# Patient Record
Sex: FEMALE | Race: WHITE
[De-identification: names, ages, dates, MRNs, and addresses within clinical notes are randomized per-mention and may not be internally consistent; named-entity substitution may affect disease eponyms.]

---

## 2018-07-27 ENCOUNTER — HOSPITAL ENCOUNTER (OUTPATIENT)
Dept: HOSPITAL 47 - RADMAMWWP | Age: 53
Discharge: HOME | End: 2018-07-27
Payer: COMMERCIAL

## 2018-07-27 DIAGNOSIS — Z12.31: Primary | ICD-10-CM

## 2018-07-27 PROCEDURE — 77063 BREAST TOMOSYNTHESIS BI: CPT

## 2018-07-27 PROCEDURE — 77067 SCR MAMMO BI INCL CAD: CPT

## 2018-08-01 NOTE — MM
Reason for exam: screening  (asymptomatic).

Last mammogram was performed 2 years ago.



History:

Family history of breast cancer.



Physical Findings:

A clinical breast exam by your physician is recommended on an annual basis and 

results should be correlated with mammographic findings.



MG 3D Screening Mammo W/Cad

Bilateral CC and MLO view(s) were taken.

Prior study comparison: July 26, 2016, bilateral MG 3d screening mammo w/cad.  

December 4, 2006, CAD bilateral diagnostic mammogram.

There are two new subcentimeter left breast masses at posterior depth. First upper

outer quadrant and the second central inner left breast. There is a new central 

lower right breast mass.





ASSESSMENT: Incomplete: need additional imaging evaluation, BI-RAD 0



RECOMMENDATION:

Special view mammogram of both breasts.



If lesion persists on supplemental views, image directed ultrasound is 

recommended.



[facility] will attempt to contact patient to return for supplemental 

views and ultrasound if indicated.

## 2018-08-09 ENCOUNTER — HOSPITAL ENCOUNTER (OUTPATIENT)
Dept: HOSPITAL 47 - RADMAMWWP | Age: 53
Discharge: HOME | End: 2018-08-09
Payer: COMMERCIAL

## 2018-08-09 DIAGNOSIS — R92.8: Primary | ICD-10-CM

## 2018-08-09 PROCEDURE — 77062 BREAST TOMOSYNTHESIS BI: CPT

## 2018-08-09 PROCEDURE — 77066 DX MAMMO INCL CAD BI: CPT

## 2018-08-11 NOTE — USB
Reason for exam: additional evaluation requested from abnormal screening.



History:

Patient is postmenopausal.

Family history of breast cancer in paternal grandmother.

Took hormonal contraceptives for 10 years.  Taking estrogen for 11 months.



US Breast Workup Limited NICCI

Bilateral complete breast ultrasound includes all four quadrants, the retroareolar

region and axilla. Finding demonstrates no cystic or solid lesion seen in either 

breast.





ASSESSMENT: Probably benign, BI-RAD 3



RECOMMENDATION:

Follow-up diagnostic mammogram of both breasts in 6 months.

## 2018-08-11 NOTE — MM
Reason for exam: additional evaluation requested from abnormal screening.

Last mammogram was performed less than 1 month ago.



History:

Patient is postmenopausal.

Family history of breast cancer in paternal grandmother.

Took hormonal contraceptives for 10 years.  Taking estrogen for 11 months.



Physical Findings:

Nurse did not find any significant physical abnormalities on exam.



MG 3D Work Up W/Cad NICCI

Bilateral spot compression CC, spot compression MLO, and LM view(s) were taken.

Prior study comparison: July 27, 2018, bilateral MG 3d screening mammo w/cad.  

July 26, 2016, bilateral MG 3d screening mammo w/cad.

There are scattered fibroglandular densities.

Finding: There is a 5 mm oval circumscribed lesion in the lower outer posterior 

right breast.

Left persists largest lower portion medial 4-5 mm.



These results were verbally communicated with the patient and result sheet given 

to the patient on 8/9/18.





ASSESSMENT: Incomplete: need additional imaging evaluation, BI-RAD 0



RECOMMENDATION:

Ultrasound of both breasts.

## 2019-10-27 ENCOUNTER — HOSPITAL ENCOUNTER (OUTPATIENT)
Dept: HOSPITAL 47 - EC | Age: 54
Setting detail: OBSERVATION
LOS: 1 days | Discharge: HOME | End: 2019-10-28
Payer: COMMERCIAL

## 2019-10-27 DIAGNOSIS — Z82.49: ICD-10-CM

## 2019-10-27 DIAGNOSIS — Z90.49: ICD-10-CM

## 2019-10-27 DIAGNOSIS — Z79.899: ICD-10-CM

## 2019-10-27 DIAGNOSIS — Z88.2: ICD-10-CM

## 2019-10-27 DIAGNOSIS — J45.909: ICD-10-CM

## 2019-10-27 DIAGNOSIS — E78.5: ICD-10-CM

## 2019-10-27 DIAGNOSIS — Z90.710: ICD-10-CM

## 2019-10-27 DIAGNOSIS — Z88.1: ICD-10-CM

## 2019-10-27 DIAGNOSIS — I10: ICD-10-CM

## 2019-10-27 DIAGNOSIS — R07.89: Primary | ICD-10-CM

## 2019-10-27 DIAGNOSIS — Z96.643: ICD-10-CM

## 2019-10-27 LAB
ALBUMIN SERPL-MCNC: 4.7 G/DL (ref 3.5–5)
ALP SERPL-CCNC: 75 U/L (ref 38–126)
ALT SERPL-CCNC: 39 U/L (ref 9–52)
ANION GAP SERPL CALC-SCNC: 12 MMOL/L
APTT BLD: 24.4 SEC (ref 22–30)
AST SERPL-CCNC: 26 U/L (ref 14–36)
BASOPHILS # BLD AUTO: 0.1 K/UL (ref 0–0.2)
BASOPHILS NFR BLD AUTO: 1 %
BUN SERPL-SCNC: 18 MG/DL (ref 7–17)
CALCIUM SPEC-MCNC: 10.4 MG/DL (ref 8.4–10.2)
CHLORIDE SERPL-SCNC: 103 MMOL/L (ref 98–107)
CO2 SERPL-SCNC: 27 MMOL/L (ref 22–30)
EOSINOPHIL # BLD AUTO: 0.2 K/UL (ref 0–0.7)
EOSINOPHIL NFR BLD AUTO: 3 %
ERYTHROCYTE [DISTWIDTH] IN BLOOD BY AUTOMATED COUNT: 4.85 M/UL (ref 3.8–5.4)
ERYTHROCYTE [DISTWIDTH] IN BLOOD: 12.6 % (ref 11.5–15.5)
GLUCOSE SERPL-MCNC: 86 MG/DL (ref 74–99)
HCT VFR BLD AUTO: 43.1 % (ref 34–46)
HGB BLD-MCNC: 14.2 GM/DL (ref 11.4–16)
INR PPP: 0.9 (ref ?–1.2)
LYMPHOCYTES # SPEC AUTO: 2.4 K/UL (ref 1–4.8)
LYMPHOCYTES NFR SPEC AUTO: 38 %
MAGNESIUM SPEC-SCNC: 2.2 MG/DL (ref 1.6–2.3)
MCH RBC QN AUTO: 29.4 PG (ref 25–35)
MCHC RBC AUTO-ENTMCNC: 33 G/DL (ref 31–37)
MCV RBC AUTO: 88.9 FL (ref 80–100)
MONOCYTES # BLD AUTO: 0.3 K/UL (ref 0–1)
MONOCYTES NFR BLD AUTO: 4 %
NEUTROPHILS # BLD AUTO: 3.4 K/UL (ref 1.3–7.7)
NEUTROPHILS NFR BLD AUTO: 52 %
PLATELET # BLD AUTO: 367 K/UL (ref 150–450)
POTASSIUM SERPL-SCNC: 3.8 MMOL/L (ref 3.5–5.1)
PROT SERPL-MCNC: 7.8 G/DL (ref 6.3–8.2)
PT BLD: 9.8 SEC (ref 9–12)
SODIUM SERPL-SCNC: 142 MMOL/L (ref 137–145)
WBC # BLD AUTO: 6.4 K/UL (ref 3.8–10.6)

## 2019-10-27 PROCEDURE — 93005 ELECTROCARDIOGRAM TRACING: CPT

## 2019-10-27 PROCEDURE — 83735 ASSAY OF MAGNESIUM: CPT

## 2019-10-27 PROCEDURE — 80061 LIPID PANEL: CPT

## 2019-10-27 PROCEDURE — 80048 BASIC METABOLIC PNL TOTAL CA: CPT

## 2019-10-27 PROCEDURE — 93351 STRESS TTE COMPLETE: CPT

## 2019-10-27 PROCEDURE — 85025 COMPLETE CBC W/AUTO DIFF WBC: CPT

## 2019-10-27 PROCEDURE — 93306 TTE W/DOPPLER COMPLETE: CPT

## 2019-10-27 PROCEDURE — 36415 COLL VENOUS BLD VENIPUNCTURE: CPT

## 2019-10-27 PROCEDURE — 84484 ASSAY OF TROPONIN QUANT: CPT

## 2019-10-27 PROCEDURE — 85610 PROTHROMBIN TIME: CPT

## 2019-10-27 PROCEDURE — 99285 EMERGENCY DEPT VISIT HI MDM: CPT

## 2019-10-27 PROCEDURE — 85730 THROMBOPLASTIN TIME PARTIAL: CPT

## 2019-10-27 PROCEDURE — 80053 COMPREHEN METABOLIC PANEL: CPT

## 2019-10-27 PROCEDURE — 71046 X-RAY EXAM CHEST 2 VIEWS: CPT

## 2019-10-27 NOTE — XR
EXAMINATION TYPE: XR chest 2V

 

DATE OF EXAM: 10/27/2019

 

COMPARISON: NONE

 

HISTORY: Chest pain

 

TECHNIQUE:  Frontal and lateral views of the chest are obtained.

 

FINDINGS:  Heart and mediastinum are normal. Lungs are clear. Diaphragm is normal. Bony thorax appear
s intact. There are chest leads.

 

IMPRESSION:  Normal chest.

## 2019-10-27 NOTE — ED
Chest Pain HPI





- General


Chief Complaint: Chest Pain


Stated Complaint: chest pain


Time Seen by Provider: 10/27/19 12:40


Source: patient


Mode of arrival: wheelchair


Limitations: no limitations





- History of Present Illness


Initial Comments: 





The patient is a 54-year-old female presents emergency Department with reported 

chest pain.  She states the symptoms started 1 hour prior to arrival to the 

emergency room.  States that she was in line at the grocery store when the 

symptoms began.  She describes it as a chest pressure with radiation to her left

jaw.  Denies associated nausea or vomiting.  No shortness of breath.  No history

of DVT or PE.  No ripping or tearing sensation to her back.  No hormone use.  No

history of blood clotting disorders.  No headaches or visual changes.  Does not 

take any medications for her symptoms.  Presented to the emergency department 

and the symptoms have resolved.  She states she has a large family history of 

cardiac disease.  Her brother  at the age of 41 from a heart attack.  The 

patient's last stress test was in 2017.  She has a history of hypertension.  No 

history of smoking.  Denies hemoptysis, abdominal pain.  There are no 

alleviating, precipitating or modifying factors





- Related Data


                                Home Medications











 Medication  Instructions  Recorded  Confirmed


 


Albuterol Inhaler [Ventolin Hfa 1 - 2 puff INHALATION RT-Q6H PRN 10/27/19 

10/27/19





Inhaler]   


 


Ibuprofen [Motrin Ib] 400 mg PO Q6H PRN 10/27/19 10/27/19


 


Montelukast [Singulair] 10 mg PO DAILY 10/27/19 10/27/19


 


amLODIPine BESYLATE/BENAZEPRIL 1 cap PO DAILY 10/27/19 10/27/19





[Lotrel 5-10 MG]   








                                  Previous Rx's











 Medication  Instructions  Recorded


 


Atorvastatin [Lipitor] 20 mg PO HS #30 tab 10/28/19











                                    Allergies











Allergy/AdvReac Type Severity Reaction Status Date / Time


 


Sulfa (Sulfonamide Allergy Unknown Unknown Verified 10/27/19 13:33





Antibiotics)     


 


clindamycin Allergy  Abdominal Verified 10/27/19 13:33





   Pain  


 


sulfite Allergy  Unknown Verified 10/27/19 13:33














Review of Systems


ROS Statement: 


Those systems with pertinent positive or pertinent negative responses have been 

documented in the HPI.





ROS Other: All systems not noted in ROS Statement are negative.





EKG Findings





- EKG Comments:


EKG Findings:: EKG demonstrates normal sinus rhythm with a ventricular rate of 

76.  WA interval 162.  QRS 74.   there are no acute ST segment elevations

 or depressions concerning for ischemic changes





Past Medical History


Past Medical History: Asthma, Hypertension


Additional Past Medical History / Comment(s): seasonal allergies


History of Any Multi-Drug Resistant Organisms: None Reported


Past Surgical History: Cholecystectomy, Hysterectomy, Joint Replacement


Additional Past Surgical History / Comment(s): sacral - pelvical floor lifted


Past Psychological History: No Psychological Hx Reported


Smoking Status: Never smoker


Past Alcohol Use History: None Reported


Past Drug Use History: None Reported





General Exam


Limitations: no limitations


General appearance: alert, in no apparent distress


Head exam: Present: atraumatic, normocephalic, normal inspection


Eye exam: Present: normal appearance, PERRL, EOMI.  Absent: scleral icterus, 

conjunctival injection, periorbital swelling


ENT exam: Present: normal exam, mucous membranes moist


Neck exam: Present: normal inspection.  Absent: tenderness, meningismus, 

lymphadenopathy


Respiratory exam: Present: normal lung sounds bilaterally.  Absent: respiratory 

distress, wheezes, rales, rhonchi, stridor


Cardiovascular Exam: Present: regular rate, normal rhythm, normal heart sounds. 

 Absent: systolic murmur, diastolic murmur, rubs, gallop, clicks


GI/Abdominal exam: Present: soft, normal bowel sounds.  Absent: distended, 

tenderness, guarding, rebound, rigid


Extremities exam: Present: normal inspection, full ROM, normal capillary refill.

  Absent: tenderness, pedal edema, joint swelling, calf tenderness


Back exam: Present: normal inspection


Neurological exam: Present: alert, oriented X3, CN II-XII intact


Psychiatric exam: Present: normal affect, normal mood


Skin exam: Present: warm, dry, intact, normal color.  Absent: rash





Course


                                   Vital Signs











  10/27/19 10/27/19





  12:37 13:19


 


Temperature 97.9 F 


 


Pulse Rate 80 


 


Pulse Rate [  96





Cardiac Monitor  





]  


 


Respiratory 18 





Rate  


 


Blood Pressure 136/94 


 


O2 Sat by Pulse 95 





Oximetry  














Chest Pain MDM





- MDM





Upon arrival the patient is placed into room 7.  There are some physical exam is

performed.  A 12-lead EKG is performed and the patient.  I did recommend 

laboratory studies and a chest x-ray.  Laboratory studies are unremarkable.  

First troponin is negative.  Chest x-ray demonstrates no acute findings.  The 

patient is pain-free at this time.  She was given 4 chewable aspirins.  I did 

recommend admission to the hospital because of her large cardiac history for 

which the patient did agree.  I called and discussed the case with Dr. roblero who

accepted admission.  The patient was then transported to floor in stable 

condition





Disposition


Clinical Impression: 


 Chest pain





Disposition: ADMITTED AS IP TO THIS HOSP


Condition: Stable


Is patient prescribed a controlled substance at d/c from ED?: No


Decision to Admit Reason: Admit from EC


Decision Date: 10/27/19


Decision Time: 15:58

## 2019-10-28 VITALS — RESPIRATION RATE: 18 BRPM | SYSTOLIC BLOOD PRESSURE: 139 MMHG | DIASTOLIC BLOOD PRESSURE: 97 MMHG

## 2019-10-28 VITALS — TEMPERATURE: 98.2 F | HEART RATE: 90 BPM

## 2019-10-28 LAB
ANION GAP SERPL CALC-SCNC: 8 MMOL/L
BASOPHILS # BLD AUTO: 0.1 K/UL (ref 0–0.2)
BASOPHILS NFR BLD AUTO: 1 %
BUN SERPL-SCNC: 20 MG/DL (ref 7–17)
CALCIUM SPEC-MCNC: 9.7 MG/DL (ref 8.4–10.2)
CHLORIDE SERPL-SCNC: 104 MMOL/L (ref 98–107)
CHOLEST SERPL-MCNC: 200 MG/DL (ref ?–200)
CO2 SERPL-SCNC: 30 MMOL/L (ref 22–30)
EOSINOPHIL # BLD AUTO: 0.2 K/UL (ref 0–0.7)
EOSINOPHIL NFR BLD AUTO: 4 %
ERYTHROCYTE [DISTWIDTH] IN BLOOD BY AUTOMATED COUNT: 4.36 M/UL (ref 3.8–5.4)
ERYTHROCYTE [DISTWIDTH] IN BLOOD: 12.8 % (ref 11.5–15.5)
GLUCOSE SERPL-MCNC: 97 MG/DL (ref 74–99)
HCT VFR BLD AUTO: 39.5 % (ref 34–46)
HDLC SERPL-MCNC: 54 MG/DL (ref 40–60)
HGB BLD-MCNC: 12.9 GM/DL (ref 11.4–16)
LDLC SERPL CALC-MCNC: 125 MG/DL (ref 0–99)
LYMPHOCYTES # SPEC AUTO: 2.1 K/UL (ref 1–4.8)
LYMPHOCYTES NFR SPEC AUTO: 36 %
MCH RBC QN AUTO: 29.7 PG (ref 25–35)
MCHC RBC AUTO-ENTMCNC: 32.7 G/DL (ref 31–37)
MCV RBC AUTO: 90.7 FL (ref 80–100)
MONOCYTES # BLD AUTO: 0.3 K/UL (ref 0–1)
MONOCYTES NFR BLD AUTO: 5 %
NEUTROPHILS # BLD AUTO: 2.9 K/UL (ref 1.3–7.7)
NEUTROPHILS NFR BLD AUTO: 51 %
PLATELET # BLD AUTO: 347 K/UL (ref 150–450)
POTASSIUM SERPL-SCNC: 4 MMOL/L (ref 3.5–5.1)
SODIUM SERPL-SCNC: 142 MMOL/L (ref 137–145)
TRIGL SERPL-MCNC: 107 MG/DL (ref ?–150)
WBC # BLD AUTO: 5.7 K/UL (ref 3.8–10.6)

## 2019-10-28 NOTE — P.DS
Providers


Date of admission: 


10/27/19 15:59





Attending physician: 


Paul Franz MD





Consults: 





                                        





10/27/19 16:02


Consult Physician Urgent 


   Consulting Provider: [facility]


   Consult Reason/Comments: acute chest pain


   Do you want consulting provider notified?: Yes











Primary care physician: 


Candace Aldana





[facility] [place] Course: 


Please refer to my HPI





Patient Condition at Discharge: Stable





Plan - Discharge Summary


Discharge Rx Participant: No


New Discharge Prescriptions: 


New


   Atorvastatin [Lipitor] 20 mg PO HS #30 tab





Continue


   amLODIPine BESYLATE/BENAZEPRIL [Lotrel 5-10 MG] 1 cap PO DAILY


   Montelukast [Singulair] 10 mg PO DAILY


   Albuterol Inhaler [Ventolin Hfa Inhaler] 1 - 2 puff INHALATION RT-Q6H PRN


     PRN Reason: Shortness Of Breath


   Ibuprofen [Motrin Ib] 400 mg PO Q6H PRN


     PRN Reason: Pain


Discharge Medication List





Albuterol Inhaler [Ventolin Hfa Inhaler] 1 - 2 puff INHALATION RT-Q6H PRN 

10/27/19 [History]


Ibuprofen [Motrin Ib] 400 mg PO Q6H PRN 10/27/19 [History]


Montelukast [Singulair] 10 mg PO DAILY 10/27/19 [History]


amLODIPine BESYLATE/BENAZEPRIL [Lotrel 5-10 MG] 1 cap PO DAILY 10/27/19 

[History]


Atorvastatin [Lipitor] 20 mg PO HS #30 tab 10/28/19 [Rx]








Follow up Appointment(s)/Referral(s): 


Asha Hunt MD [STAFF PHYSICIAN] - 1 Week


Jovan Maria MD [Primary Care Provider] - 3 Days


Patient Instructions/Handouts:  Chest Pain (DC)


Discharge Disposition: HOME SELF-CARE

## 2019-10-28 NOTE — CONS
CONSULTATION



Natasha Velez is a 54-year-old lady with a very strong family history of premature

coronary disease with her father having had a bypass surgery in the 40s and her brother

having passed away with an acute MI.  This lady also has hypertension and bronchial

asthma.  She sees Dr. Brito in the outpatient setting.  Her primary care physician is

Dr. Maria.  She came in because she had midsternal discomfort that lasted according to

her about 15-20 minutes.  Pain came on at rest and seemed to persist for a few minutes

and then it was resolved.  She has no chest pain at this time.  The quality of the pain

seems very atypical and EKG, two sets are unremarkable, troponins are unremarkable.

Apparently, she was in the grocery store when she had some chest pressure.  There was

some radiation to the left jaw, occurred at rest.  There was no nausea, vomiting, or

diaphoresis.  She came into the emergency room and by the time her symptoms resolved

and never recurred.  Her last stress test apparently was a few years ago.  She does not

recall exactly when.  She has no symptoms of chest pain, shortness of breath or

palpitation at the time of my evaluation.



PAST MEDICAL HISTORY:

1. Bronchial asthma.

2. Hypertension.

3. No evidence of any prior myocardial infarction or CVA or diabetes.

She is status post hysterectomy, cholecystectomy, appendectomy and bilateral hip

arthroplasty.



ALLERGIES:

Allergic to SULFA, CLINDAMYCIN.



MEDICATIONS:

At home include amlodipine, benazepril combination, Lotrel 510 one tablet daily,

ibuprofen and also inhalers.  She also takes Singulair 10 mg daily.



PHYSICAL EXAMINATION:

Blood pressure is 130/80, pulse rate 78 per minute, regular.

HEENT:  Unremarkable.  Fundus was not examined by me.

Neck is supple.  No JVD.  I do not hear a carotid bruit.  There is no thyromegaly.

Heart exam reveals S1, S2 heard normally without a rub, murmur or gallop.

Lungs are clear.

Abdomen is soft, nontender.

Lower extremities reveal normal pulses.  No edema.

Central nervous system is normal.



EKG revealed sinus mechanism, no acute changes.



IMPRESSION:

1. Chest pain syndrome, seems atypical.

2. Hypertension.

3. Hyperlipidemia with LDL of 125.

4. Family history of premature coronary artery disease.



RECOMMENDATION:

I am recommending that we add a very small dose of beta blocker, perform an

echocardiogram, a stress echo and if these are normal she can be discharged.  I will

initiate her on Lipitor 20 mg daily, given her risk factors of hypertension, family

history and elevated LDL cholesterol.  I discussed my thoughts in detail with the

patient.



Thank you very much for the consult.





ALVIN / EMMANUELLE: [id number] / Job#: [id number]

## 2019-10-28 NOTE — P.HPIM
History of Present Illness


Patient Came in with complains of mild-to-moderate chest pain in the 

retrosternal area pressure-like sensation denied any associated with nausea 

lightheadedness shortness of breath etc. some radiation to the left chest.  

Patient does have significant family history with her brother dying of heart 

attack at age 41 and multiple other family members 7 coronary artery disease.  

Her chest pain completely resolved at that time at this time patient chest pain 

is nonpleuritic not associated with food no diaphoresis.  Denied any cough chest

x-ray did not show pneumonia








Review of Systems








REVIEW OF SYSTEMS: 


CONSTITUTIONAL: No fever, no malaise, no fatigue. 


HEENT: No recent visual problems or hearing problems. Denied any sore throat. 


CARDIOVASCULAR: No  orthopnea, PND, no palpitations, no syncope. 


PULMONARY: No shortness of breath, no cough, no hemoptysis. 


GASTROINTESTINAL: No diarrhea, no nausea, no vomiting, no abdominal pain. 


NEUROLOGICAL: No headaches, no weakness, no numbness. 


HEMATOLOGICAL: Denies any bleeding or petechiae. 


GENITOURINARY: Denies any burning micturition, frequency, or urgency. 


MUSCULOSKELETAL/RHEUMATOLOGICAL: Denies any joint pain, swelling, or any muscle 

pain. 


ENDOCRINE: Denies any polyuria or polydipsia. 





The rest of the 14-point review of systems is negative.











Past Medical History


Past Medical History: Asthma, Hypertension


Additional Past Medical History / Comment(s): seasonal allergies


History of Any Multi-Drug Resistant Organisms: None Reported


Past Surgical History: Cholecystectomy, Hysterectomy, Joint Replacement


Additional Past Surgical History / Comment(s): sacral - pelvical floor lifted


Past Psychological History: No Psychological Hx Reported


Smoking Status: Never smoker


Past Alcohol Use History: None Reported


Past Drug Use History: None Reported





Medications and Allergies


                                Home Medications











 Medication  Instructions  Recorded  Confirmed  Type


 


Albuterol Inhaler [Ventolin Hfa 1 - 2 puff INHALATION RT-Q6H PRN 10/27/19 

10/27/19 History





Inhaler]    


 


Ibuprofen [Motrin Ib] 400 mg PO Q6H PRN 10/27/19 10/27/19 History


 


Montelukast [Singulair] 10 mg PO DAILY 10/27/19 10/27/19 History


 


amLODIPine BESYLATE/BENAZEPRIL 1 cap PO DAILY 10/27/19 10/27/19 History





[Lotrel 5-10 MG]    


 


Atorvastatin [Lipitor] 20 mg PO HS #30 tab 10/28/19  Rx








                                    Allergies











Allergy/AdvReac Type Severity Reaction Status Date / Time


 


Sulfa (Sulfonamide Allergy Unknown Unknown Verified 10/27/19 13:33





Antibiotics)     


 


clindamycin Allergy  Abdominal Verified 10/27/19 13:33





   Pain  


 


sulfite Allergy  Unknown Verified 10/27/19 13:33














Physical Exam


Vitals: 


                                   Vital Signs











  Temp Pulse Pulse Resp BP BP Pulse Ox


 


 10/28/19 12:00  98.2 F   90  18  139/97   96


 


 10/28/19 07:30  98.2 F   90  16   151/94  97


 


 10/28/19 04:00  98.3 F   79  18   130/80  97


 


 10/28/19 03:02     18   


 


 10/27/19 23:24  98.4 F   98  18  118/72   96


 


 10/27/19 23:12     18   


 


 10/27/19 20:00     18   


 


 10/27/19 19:11  98.7 F   79  18  130/79   97


 


 10/27/19 17:55  98.3 F  97   16  131/88   96








                                Intake and Output











 10/28/19 10/28/19 10/28/19





 06:59 14:59 22:59


 


Intake Total  240 


 


Balance  240 


 


Intake:   


 


  Oral  240 


 


Other:   


 


  Voiding Method Toilet Toilet 


 


  # Voids 1  


 


  Weight  74.843 kg 

















PHYSICAL EXAMINATION: 





GENERAL: The patient is alert and oriented x3, not in any acute distress. Well 

developed, well nourished. 


HEENT: Pupils are round and equally reacting to light. EOMI. No scleral icterus.

 No conjunctival pallor. Normocephalic, atraumatic. No pharyngeal erythema. No 

thyromegaly. 


CARDIOVASCULAR: S1 and S2 present. No murmurs, rubs, or gallops. 


PULMONARY: Chest is clear to auscultation, no wheezing or crackles. 


ABDOMEN: Soft, nontender, nondistended, normoactive bowel sounds. No palpable 

organomegaly. 


MUSCULOSKELETAL: No joint swelling or deformity.


EXTREMITIES: No cyanosis, clubbing, or pedal edema. 


NEUROLOGICAL: Gross neurological examination did not reveal any focal deficits. 


SKIN: No rashes. 

















Results


CBC & Chem 7: 


                                 10/28/19 04:32





                                 10/28/19 04:34


Labs: 


                  Abnormal Lab Results - Last 24 Hours (Table)











  10/28/19 Range/Units





  04:34 


 


BUN  20 H  (7-17)  mg/dL


 


Cholesterol  200 H  (<200)  mg/dL


 


LDL Cholesterol, Calc  125 H  (0-99)  mg/dL














Thrombosis Risk Factor Assmnt





- Choose All That Apply


Any of the Below Risk Factors Present?: Yes


Each Factor Represents 1 point: Age 41-60 years


Other Risk Factors: No


Thrombosis Risk Factor Assessment Total Risk Factor Score: 1


Thrombosis Risk Factor Assessment Level: Low Risk





Assessment and Plan


Plan: 


Chest pain: Rule out acute coronary syndromes unstable angina patient has 

atypical chest pain and underwent stress test which was negative patient is 

being discharged today although etiology of chest pain is not clear


-Hyperlipidemia patient will continued on 20 mg of Lipitor and  at counse

ling was provided


-Hypertension


-Asthma without any acute exacerbation

## 2019-10-28 NOTE — ECHOF
Referral Reason:chest pain



MEASUREMENTS

--------

HEIGHT: 132.1 cm

WEIGHT: 74.8 kg

BP: 

IVSd:   1.1 cm     (0.6 - 1.1)

LVIDd:   4.2 cm     (3.9 - 5.3)

LVPWd:   1.1 cm     (0.6 - 1.1)

IVSs:   1.5 cm

LVIDs:   3.0 cm

LVPWs:   1.4 cm

LA Diam:   3.2 cm     (2.7 - 3.8)

LAESV Index (A-L):   31.28 ml/m

Ao Diam:   3.5 cm     (2.0 - 3.7)

AV Cusp:   1.3 cm     (1.5 - 2.6)

LA Diam:   3.1 cm     (2.7 - 3.8)

MV EXCURSION:   18.395 mm     (> 18.000)

MV EF SLOPE:   95 mm/s     (70 - 150)

EPSS:   0.5 cm

MV E Jeffrey:   0.73 m/s

MV DecT:   307 ms

MV A Jeffrey:   0.90 m/s

MV E/A Ratio:   0.82 

RAP:   5.00 mmHg

TAPSE:   21.52 mm







FINDINGS

--------

Sinus rhythm.

This was a technically good study.

LV size, wall thickness and systolic function are normal, with an EF greater than 55%.   The left shelton
tricular size is normal.   The diastolic filling pattern is normal for the age of the patient 6.60.

The right ventricle is normal in size.

The left atrial size is normal.

The right atrial size is normal.

Interatrial and interventricular septum intact.

The aortic valve is trileaflet, and appears structurally normal. No aortic stenosis or regurgitation.


Mild mitral regurgitation is present.

Mild tricuspid regurgitation present.   Right ventricular systolic pressure is normal at < 35 mmHg.  
 There is no evidence of pulmonary hypertension.

There is no pulmonic regurgitation present.

There is no pericardial effusion.



CONCLUSIONS

--------

1. Sinus rhythm.

2. This was a technically good study.

3. LV size, wall thickness and systolic function are normal, with an EF greater than 55%.

4. The left ventricular size is normal.

5. The diastolic filling pattern is normal for the age of the patient 6.60

6. The right ventricle is normal in size.

7. The left atrial size is normal.

8. The right atrial size is normal.

9. Interatrial and interventricular septum intact.

10. The aortic valve is trileaflet, and appears structurally normal. No aortic stenosis or regurgitat
ion.

11. Mild mitral regurgitation is present.

12. Mild tricuspid regurgitation present.

13. Right ventricular systolic pressure is normal at < 35 mmHg.

14. There is no evidence of pulmonary hypertension.

15. There is no pulmonic regurgitation present.

16. There is no pericardial effusion.





SONOGRAPHER: Penny Saleh RDCS

## 2019-10-29 NOTE — ECHOS
STRESS ECHOCARDIOGRAM



INDICATIONS:

Chest pain



BASELINE HEART RATE:

90



BASELINE BLOOD PRESSURE:

122/66



MAXIMUM HEART RATE:

157



MAXIMUM BLOOD PRESSURE:

160/79



85% MPHR:

141



100% MPHR:

166



METS:

9.7



MAXIMUM STAGE REACHED:

3



TOTAL EXERCISE TIME:

8:00



CLINICAL INFORMATION:

Baseline EKG revealed normal sinus rhythm without significant ST changes.  Patient

walked for 8 minutes, achieved a maximal heart rate of 157 beats per minute, developed

fatigue and shortness of breath but did not have any angina or arrhythmia.  EKG did not

reveal any ST-segment changes to indicate ischemia.  By EKG criteria this is a negative

stress test with fair exercise capacity.



Baseline echo images revealed normal wall motion and wall thickening of all segments.



At peak exercise, there was good augmentation of left ventricular wall motion and wall

thickening of all segments suggesting that there is no evidence of stress-induced

ischemia on this study.



IMPRESSION:

1. Fair exercise capacity with a negative stress test by EKG criteria.

2. Normal stress echocardiogram.





MMODL / IJN: [id number] / Job#: [id number]